# Patient Record
Sex: FEMALE | Race: OTHER | Employment: STUDENT | URBAN - METROPOLITAN AREA
[De-identification: names, ages, dates, MRNs, and addresses within clinical notes are randomized per-mention and may not be internally consistent; named-entity substitution may affect disease eponyms.]

---
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
## 2020-01-21 ENCOUNTER — TELEPHONE (OUTPATIENT)
Dept: SURGERY | Facility: CLINIC | Age: 20
End: 2020-01-21

## 2020-01-21 NOTE — TELEPHONE ENCOUNTER
rcvd med rec req for several dates to be sent to Dr Cassandra Friedman.   Sent to [facility] & scanning

## 2021-09-13 PROBLEM — M47.812 CERVICAL SPONDYLOSIS WITHOUT MYELOPATHY: Status: ACTIVE | Noted: 2021-09-13

## 2021-09-13 PROBLEM — M47.817 LUMBOSACRAL SPONDYLOSIS WITHOUT MYELOPATHY: Status: ACTIVE | Noted: 2021-09-13

## 2021-09-13 PROBLEM — M51.9 DISORDER OF INTERVERTEBRAL DISC OF LUMBAR SPINE: Status: ACTIVE | Noted: 2021-09-13

## 2023-06-22 ENCOUNTER — APPOINTMENT (OUTPATIENT)
Dept: URBAN - METROPOLITAN AREA CLINIC 247 | Age: 23
Setting detail: DERMATOLOGY
End: 2023-06-22

## 2023-06-22 DIAGNOSIS — L70.0 ACNE VULGARIS: ICD-10-CM

## 2023-06-22 PROCEDURE — 99203 OFFICE O/P NEW LOW 30 MIN: CPT

## 2023-06-22 PROCEDURE — OTHER COUNSELING: OTHER

## 2023-06-22 PROCEDURE — OTHER PRESCRIPTION: OTHER

## 2023-06-22 PROCEDURE — OTHER MEDICATION COUNSELING: OTHER

## 2023-06-22 PROCEDURE — OTHER PRESCRIPTION MEDICATION MANAGEMENT: OTHER

## 2023-06-22 RX ORDER — TRETIONIN 0.25 MG/G
CREAM TOPICAL QHS
Qty: 20 | Refills: 11 | Status: ERX | COMMUNITY
Start: 2023-06-22

## 2023-06-22 RX ORDER — CLINDAMYCIN PHOSPHATE 10 MG/ML
LOTION TOPICAL
Qty: 60 | Refills: 11 | Status: ERX | COMMUNITY
Start: 2023-06-22

## 2023-06-22 RX ORDER — SPIRONOLACTONE 50 MG/1
TABLET, FILM COATED ORAL BID
Qty: 60 | Refills: 5 | Status: ERX | COMMUNITY
Start: 2023-06-22

## 2023-06-22 ASSESSMENT — LOCATION SIMPLE DESCRIPTION DERM
LOCATION SIMPLE: LEFT FOREHEAD
LOCATION SIMPLE: CHEST
LOCATION SIMPLE: RIGHT FOREHEAD
LOCATION SIMPLE: RIGHT UPPER BACK
LOCATION SIMPLE: LEFT CHEEK
LOCATION SIMPLE: LEFT UPPER BACK
LOCATION SIMPLE: RIGHT CHEEK

## 2023-06-22 ASSESSMENT — LOCATION ZONE DERM
LOCATION ZONE: FACE
LOCATION ZONE: TRUNK

## 2023-06-22 ASSESSMENT — LOCATION DETAILED DESCRIPTION DERM
LOCATION DETAILED: LEFT SUPERIOR UPPER BACK
LOCATION DETAILED: RIGHT FOREHEAD
LOCATION DETAILED: LEFT MEDIAL SUPERIOR CHEST
LOCATION DETAILED: LEFT INFERIOR CENTRAL MALAR CHEEK
LOCATION DETAILED: RIGHT SUPERIOR UPPER BACK
LOCATION DETAILED: RIGHT LATERAL SUPERIOR CHEST
LOCATION DETAILED: LEFT LATERAL FOREHEAD
LOCATION DETAILED: RIGHT MEDIAL MALAR CHEEK

## 2023-06-22 NOTE — PROCEDURE: COUNSELING
Minocycline Pregnancy And Lactation Text: This medication is Pregnancy Category D and not consider safe during pregnancy. It is also excreted in breast milk.
Winlevi Counseling:  I discussed with the patient the risks of topical clascoterone including but not limited to erythema, scaling, itching, and stinging. Patient voiced their understanding.
Moisturizer Recommendations: CeraVe or Cetaphil
Tetracycline Counseling: Patient counseled regarding possible photosensitivity and increased risk for sunburn.  Patient instructed to avoid sunlight, if possible.  When exposed to sunlight, patients should wear protective clothing, sunglasses, and sunscreen.  The patient was instructed to call the office immediately if the following severe adverse effects occur:  hearing changes, easy bruising/bleeding, severe headache, or vision changes.  The patient verbalized understanding of the proper use and possible adverse effects of tetracycline.  All of the patient's questions and concerns were addressed. Patient understands to avoid pregnancy while on therapy due to potential birth defects.
Doxycycline Pregnancy And Lactation Text: This medication is Pregnancy Category D and not consider safe during pregnancy. It is also excreted in breast milk but is considered safe for shorter treatment courses.
Tazorac Pregnancy And Lactation Text: This medication is not safe during pregnancy. It is unknown if this medication is excreted in breast milk.
Patient Specific Counseling (Will Not Stick From Patient To Patient): No h/o breast CA or CKD
Erythromycin Pregnancy And Lactation Text: This medication is Pregnancy Category B and is considered safe during pregnancy. It is also excreted in breast milk.
Azithromycin Pregnancy And Lactation Text: This medication is considered safe during pregnancy and is also secreted in breast milk.
Benzoyl Peroxide Counseling: Patient counseled that medicine may cause skin irritation and bleach clothing.  In the event of skin irritation, the patient was advised to reduce the amount of the drug applied or use it less frequently.   The patient verbalized understanding of the proper use and possible adverse effects of benzoyl peroxide.  All of the patient's questions and concerns were addressed.
Aklief Pregnancy And Lactation Text: It is unknown if this medication is safe to use during pregnancy.  It is unknown if this medication is excreted in breast milk.  Breastfeeding women should use the topical cream on the smallest area of the skin for the shortest time needed while breastfeeding.  Do not apply to nipple and areola.
Dapsone Counseling: I discussed with the patient the risks of dapsone including but not limited to hemolytic anemia, agranulocytosis, rashes, methemoglobinemia, kidney failure, peripheral neuropathy, headaches, GI upset, and liver toxicity.  Patients who start dapsone require monitoring including baseline LFTs and weekly CBCs for the first month, then every month thereafter.  The patient verbalized understanding of the proper use and possible adverse effects of dapsone.  All of the patient's questions and concerns were addressed.
Topical Clindamycin Pregnancy And Lactation Text: This medication is Pregnancy Category B and is considered safe during pregnancy. It is unknown if it is excreted in breast milk.
Birth Control Pills Pregnancy And Lactation Text: This medication should be avoided if pregnant and for the first 30 days post-partum.
Bactrim Pregnancy And Lactation Text: This medication is Pregnancy Category D and is known to cause fetal risk.  It is also excreted in breast milk.
Azithromycin Counseling:  I discussed with the patient the risks of azithromycin including but not limited to GI upset, allergic reaction, drug rash, diarrhea, and yeast infections.
Birth Control Pills Counseling: Birth Control Pill Counseling: I discussed with the patient the potential side effects of OCPs including but not limited to increased risk of stroke, heart attack, thrombophlebitis, deep venous thrombosis, hepatic adenomas, breast changes, GI upset, headaches, and depression.  The patient verbalized understanding of the proper use and possible adverse effects of OCPs. All of the patient's questions and concerns were addressed.
Tazorac Counseling:  Patient advised that medication is irritating and drying.  Patient may need to apply sparingly and wash off after an hour before eventually leaving it on overnight.  The patient verbalized understanding of the proper use and possible adverse effects of tazorac.  All of the patient's questions and concerns were addressed.
Bactrim Counseling:  I discussed with the patient the risks of sulfa antibiotics including but not limited to GI upset, allergic reaction, drug rash, diarrhea, dizziness, photosensitivity, and yeast infections.  Rarely, more serious reactions can occur including but not limited to aplastic anemia, agranulocytosis, methemoglobinemia, blood dyscrasias, liver or kidney failure, lung infiltrates or desquamative/blistering drug rashes.
High Dose Vitamin A Pregnancy And Lactation Text: High dose vitamin A therapy is contraindicated during pregnancy and breast feeding.
Dapsone Pregnancy And Lactation Text: This medication is Pregnancy Category C and is not considered safe during pregnancy or breast feeding.
Topical Retinoid Pregnancy And Lactation Text: This medication is Pregnancy Category C. It is unknown if this medication is excreted in breast milk.
Azelaic Acid Pregnancy And Lactation Text: This medication is considered safe during pregnancy and breast feeding.
Minocycline Counseling: Patient advised regarding possible photosensitivity and discoloration of the teeth, skin, lips, tongue and gums.  Patient instructed to avoid sunlight, if possible.  When exposed to sunlight, patients should wear protective clothing, sunglasses, and sunscreen.  The patient was instructed to call the office immediately if the following severe adverse effects occur:  hearing changes, easy bruising/bleeding, severe headache, or vision changes.  The patient verbalized understanding of the proper use and possible adverse effects of minocycline.  All of the patient's questions and concerns were addressed.
Winlevi Pregnancy And Lactation Text: This medication is considered safe during pregnancy and breastfeeding.
Sunscreen Recommendations: SPF 30+ daily
Sarecycline Counseling: Patient advised regarding possible photosensitivity and discoloration of the teeth, skin, lips, tongue and gums.  Patient instructed to avoid sunlight, if possible.  When exposed to sunlight, patients should wear protective clothing, sunglasses, and sunscreen.  The patient was instructed to call the office immediately if the following severe adverse effects occur:  hearing changes, easy bruising/bleeding, severe headache, or vision changes.  The patient verbalized understanding of the proper use and possible adverse effects of sarecycline.  All of the patient's questions and concerns were addressed.
Spironolactone Pregnancy And Lactation Text: This medication can cause feminization of the male fetus and should be avoided during pregnancy. The active metabolite is also found in breast milk.
Doxycycline Counseling:  Patient counseled regarding possible photosensitivity and increased risk for sunburn.  Patient instructed to avoid sunlight, if possible.  When exposed to sunlight, patients should wear protective clothing, sunglasses, and sunscreen.  The patient was instructed to call the office immediately if the following severe adverse effects occur:  hearing changes, easy bruising/bleeding, severe headache, or vision changes.  The patient verbalized understanding of the proper use and possible adverse effects of doxycycline.  All of the patient's questions and concerns were addressed.
Erythromycin Counseling:  I discussed with the patient the risks of erythromycin including but not limited to GI upset, allergic reaction, drug rash, diarrhea, increase in liver enzymes, and yeast infections.
Spironolactone Counseling: Patient advised regarding risks of diarrhea, abdominal pain, hyperkalemia, birth defects (for female patients), liver toxicity and renal toxicity. The patient may need blood work to monitor liver and kidney function and potassium levels while on therapy. The patient verbalized understanding of the proper use and possible adverse effects of spironolactone.  All of the patient's questions and concerns were addressed.
Topical Sulfur Applications Pregnancy And Lactation Text: This medication is Pregnancy Category C and has an unknown safety profile during pregnancy. It is unknown if this topical medication is excreted in breast milk.
Topical Sulfur Applications Counseling: Topical Sulfur Counseling: Patient counseled that this medication may cause skin irritation or allergic reactions.  In the event of skin irritation, the patient was advised to reduce the amount of the drug applied or use it less frequently.   The patient verbalized understanding of the proper use and possible adverse effects of topical sulfur application.  All of the patient's questions and concerns were addressed.
Isotretinoin Counseling: Patient should get monthly blood tests, not donate blood, not drive at night if vision affected, not share medication, and not undergo elective surgery for 6 months after tx completed. Side effects reviewed, pt to contact office should one occur.
Topical Retinoid counseling:  Patient advised to apply a pea-sized amount only at bedtime and wait 30 minutes after washing their face before applying.  If too drying, patient may add a non-comedogenic moisturizer. The patient verbalized understanding of the proper use and possible adverse effects of retinoids.  All of the patient's questions and concerns were addressed.
Topical Clindamycin Counseling: Patient counseled that this medication may cause skin irritation or allergic reactions.  In the event of skin irritation, the patient was advised to reduce the amount of the drug applied or use it less frequently.   The patient verbalized understanding of the proper use and possible adverse effects of clindamycin.  All of the patient's questions and concerns were addressed.
Include Pregnancy/Lactation Warning?: No
High Dose Vitamin A Counseling: Side effects reviewed, pt to contact office should one occur.
Aklief counseling:  Patient advised to apply a pea-sized amount only at bedtime and wait 30 minutes after washing their face before applying.  If too drying, patient may add a non-comedogenic moisturizer.  The most commonly reported side effects including irritation, redness, scaling, dryness, stinging, burning, itching, and increased risk of sunburn.  The patient verbalized understanding of the proper use and possible adverse effects of retinoids.  All of the patient's questions and concerns were addressed.
Azelaic Acid Counseling: Patient counseled that medicine may cause skin irritation and to avoid applying near the eyes.  In the event of skin irritation, the patient was advised to reduce the amount of the drug applied or use it less frequently.   The patient verbalized understanding of the proper use and possible adverse effects of azelaic acid.  All of the patient's questions and concerns were addressed.
Detail Level: Detailed
Isotretinoin Pregnancy And Lactation Text: This medication is Pregnancy Category X and is considered extremely dangerous during pregnancy. It is unknown if it is excreted in breast milk.
Benzoyl Peroxide Pregnancy And Lactation Text: This medication is Pregnancy Category C. It is unknown if benzoyl peroxide is excreted in breast milk.

## 2023-06-22 NOTE — PROCEDURE: PRESCRIPTION MEDICATION MANAGEMENT
Initiate Treatment: Spironolactone 50mg BID, Clindamycin 1% lotion, Tretinoin 0.025% cream QHS
Detail Level: Zone
Render In Strict Bullet Format?: No

## 2024-12-28 NOTE — PROCEDURE: MEDICATION COUNSELING
Encounter Date: 2024       History     Chief Complaint   Patient presents with    Hypertension    Dizziness    Headache     Leanna Yee is a 36 y.o.  at 36w1d who presents to the OB ED today (2024) with CC of elevated blood pressures headache and GERD/chest pain.     Patient noted elevated pressures on her at-home cuff today which was accompanied by a headache rated about 3-4/10. This feels very similar to prior migraines. Patient has a long history of migraines for which she takes Excedrin while not pregnant. She has not tried anything for pain yet with this headache. No changes in vision.     She also reports some chest pain which she believes is due to acid reflux. It is burning and central. It is similar to prior episodes of heartburn, however, slightly different. Patient denies fevers or chills.     She reports no vaginal bleeding, no leakage of fluid, and no contractions. She reports good fetal movement.    This pregnancy is complicated by AMA, low lying placenta, now resolved.          Review of patient's allergies indicates:   Allergen Reactions    Prochlorperazine Other (See Comments)     Lost control of neck and eye muscles     No past medical history on file.  Past Surgical History:   Procedure Laterality Date    APPENDECTOMY       Family History   Problem Relation Name Age of Onset    Heart disease Father      Breast cancer Neg Hx      Colon cancer Neg Hx      Ovarian cancer Neg Hx       Social History     Tobacco Use    Smoking status: Never     Passive exposure: Never    Smokeless tobacco: Never   Substance Use Topics    Alcohol use: Yes     Comment: socially    Drug use: Never     Review of Systems   Constitutional:  Negative for fever.   HENT:  Negative for congestion and sore throat.    Respiratory:  Negative for shortness of breath.    Cardiovascular:  Positive for chest pain.   Gastrointestinal:  Negative for nausea.   Genitourinary:  Negative for dysuria and vaginal  bleeding.   Musculoskeletal:  Negative for back pain.   Skin:  Negative for rash.   Neurological:  Positive for headaches. Negative for weakness.   Hematological:  Does not bruise/bleed easily.       Physical Exam     Initial Vitals [12/28/24 1245]   BP Pulse Resp Temp SpO2   116/78 96 19 98.3 °F (36.8 °C) 97 %      MAP       --         Physical Exam    Vitals reviewed.  Constitutional: She appears well-developed and well-nourished.   HENT:   Head: Normocephalic and atraumatic.   Eyes: EOM are normal.   Neck:   Normal range of motion.  Cardiovascular:  Normal rate.           Pulmonary/Chest: No respiratory distress.   Abdominal: Abdomen is soft. There is no abdominal tenderness.   Musculoskeletal:         General: Normal range of motion.      Cervical back: Normal range of motion.     Neurological: She is alert and oriented to person, place, and time.   Skin: Skin is warm and dry.   Psychiatric: She has a normal mood and affect.         ED Course   Obtain Fetal nonstress test (NST)    Date/Time: 12/28/2024 1:39 PM    Performed by: Karine Mendoza MD  Authorized by: Norma Pascual MD    Nonstress Test:     Variability:  6-25 BPM    Decelerations:  None    Accelerations:  15 bpm    Baseline:  135    Contractions:  Regular    Contraction Frequency:  7  Biophysical Profile:     Nonstress Test Interpretation: reactive      Overall Impression:  Reassuring    Labs Reviewed   CBC W/ AUTO DIFFERENTIAL - Abnormal       Result Value    WBC 12.78 (*)     RBC 3.85 (*)     Hemoglobin 12.7      Hematocrit 35.4 (*)     MCV 92      MCH 33.0 (*)     MCHC 35.9      RDW 13.3      Platelets 192      MPV 11.6      Immature Granulocytes 0.8 (*)     Gran # (ANC) 10.3 (*)     Immature Grans (Abs) 0.10 (*)     Lymph # 1.7      Mono # 0.7      Eos # 0.0      Baso # 0.04      nRBC 0      Gran % 80.4 (*)     Lymph % 13.1 (*)     Mono % 5.2      Eosinophil % 0.2      Basophil % 0.3      Differential Method Automated     COMPREHENSIVE  METABOLIC PANEL - Abnormal    Sodium 135 (*)     Potassium 3.8      Chloride 108      CO2 15 (*)     Glucose 83      BUN 12      Creatinine 0.9      Calcium 9.3      Total Protein 6.9      Albumin 2.9 (*)     Total Bilirubin 0.2      Alkaline Phosphatase 251 (*)     AST 66 (*)     ALT 83 (*)     eGFR >60      Anion Gap 12     STREP B SCREEN, VAGINAL / RECTAL   TREPONEMA PALLIDIUM ANTIBODIES IGG, IGM    Treponema Pallidum Antibodies (IgG, IgM) Nonreactive     PROTEIN / CREATININE RATIO, URINE    Protein, Urine Random 10      Creatinine, Urine 84.6      Prot/Creat Ratio, Urine 0.12      Narrative:     Specimen Source->Urine   CBC W/ AUTO DIFFERENTIAL   COMPREHENSIVE METABOLIC PANEL   POCT URINALYSIS, DIPSTICK OR TABLET REAGENT, AUTOMATED, WITH MICROSCOP          Imaging Results    None          Medications   metoclopramide HCl tablet 10 mg (10 mg Oral Given 24 1429)   aspirin EC tablet 81 mg (81 mg Oral Given 24 1634)   sodium chloride 0.9% flush 10 mL (has no administration in time range)   ondansetron disintegrating tablet 8 mg (has no administration in time range)   senna-docusate 8.6-50 mg per tablet 1 tablet (has no administration in time range)   simethicone chewable tablet 80 mg (has no administration in time range)   diphenhydrAMINE capsule 25 mg (has no administration in time range)   diphenhydrAMINE injection 25 mg (has no administration in time range)   prenatal vitamin oral tablet (1 tablet Oral Given 24 1634)   famotidine tablet 20 mg (20 mg Oral Given 24 1650)   sodium citrate-citric acid 500-334 mg/5 ml solution 30 mL (30 mLs Oral Given 24 1323)   acetaminophen tablet 1,000 mg (1,000 mg Oral Given 24 1323)     Medical Decision Making  Leanna Yee is a 36 y.o.  at 36w1d who presents to the OB ED today (2024) with CC of elevated blood pressures headache and GERD/chest pain.    Temp:  [98.3 °F (36.8 °C)] 98.3 °F (36.8 °C)  Pulse:  [96] 96  Resp:  [19]  19  SpO2:  [97 %] 97 %  BP: (116)/(78) 116/78      Elevated BP   - Noted on home cuff today  - BP here: Normotensive except for 2 mild-range pressures: 132/92 > 134/90  - PC Ratio 0.12  - CMP Cr 0.9 plts 192, AST/ALT 66/83  - Cr elevated from 0.7 > 0.9, ALT at 83, one point below 2 times upper limit  - Given elevated pressures and transaminitis will admit for observation, blood pressure monitoring and repeat labs in 6 hours. Pending results and monitoring will admit for IOL or d/c home.     Headache  - Mild 3-4/10  - No medication at home  - No changes in vision  - Tylenol > Benadryl/Reglan > mild improvement    GERD/Chest Pain   - Epigastric/Burning pain  - EKG: NSR  - Bicitra given > resolution of pain    Karine Mendoza MD  OBGYN   PGY-1    Amount and/or Complexity of Data Reviewed  Labs: ordered. Decision-making details documented in ED Course.    Risk  OTC drugs.  Prescription drug management.              Attending Attestation:   Physician Attestation Statement for Resident:  As the supervising MD   Physician Attestation Statement: I have personally seen and examined this patient.   I agree with the above history.  -:   As the supervising MD I agree with the above PE.     As the supervising MD I agree with the above treatment, course, plan, and disposition.   -:     See ED course.  BP mild range with elevated LFTs and creat 0.9.  P/C ratio normal.  Will admit for observation and repeat labs in 6 hours.    Norma Pascual MD,AMBER  Date and Time: 12/28/2024 7:49 PM  OB Hospitalist    I was personally present during the critical portions of the procedure(s) performed by the resident and was immediately available in the ED to provide services and assistance as needed during the entire procedure.  I have reviewed and agree with the residents interpretation of the following: lab data.  I have reviewed the following: old records at this facility.                ED Course as of 12/28/24 1951   Sat Dec 28, 2024    1303 BP: 116/78 [CD]   1303 Pulse: 96 [CD]   1303 Resp: 19 [CD]   1303 SpO2: 97 % [CD]   1303 Patient is a 36 y.o. G1 at 36 1/7 weeks presents to the [facility] with c/o elevated BP at home, HA, GERD, SOB, chest pain.  She reports good FM, no contractions, no SROM. [CD]   1308 EKG due to chest pain and dyspnea, bicitra for GERD, tylenol for HA.  Monitor BP, 3rd trimester labs ordered.  Exam lungs CTA B, no JVD, no LE edema. [CD]   1319 EKG normal sinus rhythm. [CD]   1429 AST(!): 66 [CD]   1430 ALT(!): 83 [CD]   1430 Creatinine: 0.9 [CD]   1430 Hemoglobin: 12.7 [CD]   1430 Hematocrit(!): 35.4 [CD]   1430 Platelet Count: 192 [CD]   1430 Treponema Pallidum Antibodies (IgG, IgM): Nonreactive [CD]      ED Course User Index  [CD] Norma Pascual MD                           Clinical Impression:  Final diagnoses:  [R07.9] Chest pain  [R06.02] Shortness of breath (Primary)  [O16.3] Elevated blood pressure affecting pregnancy in third trimester, antepartum  [Z3A.36] 36 weeks gestation of pregnancy  [R74.8] Abnormal transaminases  [K21.9] Gastroesophageal reflux disease, unspecified whether esophagitis present          ED Disposition Condition    Send to L&D Stable                Karine Mendoza MD  Resident  12/28/24 [id number]       Norma Pascual MD  12/28/24 1951     Xolair Pregnancy And Lactation Text: This medication is Pregnancy Category B and is considered safe during pregnancy. This medication is excreted in breast milk.

## (undated) NOTE — ED AVS SNAPSHOT
[facility] Emergency Department  [place]  [address] [address]  Phone:  [phone]  Fax:  [phone]          Tay Martin   MRN: [id number]    Department:  [facility] Emergency Department   Date of Visit:  7/22/2017 CARE PHYSICIAN AT ONCE OR RETURN IMMEDIATELY TO THE EMERGENCY DEPARTMENT.     If you have been prescribed any medication(s), please fill your prescription right away and begin taking the medication(s) as directed    If the emergency physician has read X-ray

## (undated) NOTE — ED AVS SNAPSHOT
Dany Brady   MRN: [id number]    Department:  THE [facility] Emergency Department in [place]   Date of Visit:  8/17/2019           Disclosure     Insurance plans vary and the physician(s) referred by the ER may not be covered by your plan.  Please conta tell this physician (or your personal doctor if your instructions are to return to your personal doctor) about any new or lasting problems. The primary care or specialist physician will see patients referred from the [facility] Emergency Department.  Luz Maria Gore

## (undated) NOTE — ED AVS SNAPSHOT
Zena Carreon   MRN: [id number]    Department:  THE [facility] Emergency Department in [place]   Date of Visit:  4/6/2019           Disclosure     Insurance plans vary and the physician(s) referred by the ER may not be covered by your plan.  Please contac tell this physician (or your personal doctor if your instructions are to return to your personal doctor) about any new or lasting problems. The primary care or specialist physician will see patients referred from the [facility] Emergency Department.  Hari Aguirre